# Patient Record
Sex: FEMALE | Race: ASIAN | NOT HISPANIC OR LATINO | URBAN - METROPOLITAN AREA
[De-identification: names, ages, dates, MRNs, and addresses within clinical notes are randomized per-mention and may not be internally consistent; named-entity substitution may affect disease eponyms.]

---

## 2019-03-20 ENCOUNTER — OUTPATIENT (OUTPATIENT)
Dept: EMERGENCY DEPT | Facility: HOSPITAL | Age: 39
LOS: 1 days | Discharge: HOME | End: 2019-03-20
Payer: MEDICAID

## 2019-03-20 VITALS
RESPIRATION RATE: 20 BRPM | OXYGEN SATURATION: 100 % | DIASTOLIC BLOOD PRESSURE: 79 MMHG | HEART RATE: 62 BPM | TEMPERATURE: 98 F | SYSTOLIC BLOOD PRESSURE: 123 MMHG

## 2019-03-20 VITALS
SYSTOLIC BLOOD PRESSURE: 103 MMHG | RESPIRATION RATE: 16 BRPM | DIASTOLIC BLOOD PRESSURE: 65 MMHG | HEART RATE: 72 BPM | OXYGEN SATURATION: 100 %

## 2019-03-20 DIAGNOSIS — O20.0 THREATENED ABORTION: ICD-10-CM

## 2019-03-20 DIAGNOSIS — O03.9 COMPLETE OR UNSPECIFIED SPONTANEOUS ABORTION WITHOUT COMPLICATION: ICD-10-CM

## 2019-03-20 DIAGNOSIS — Z98.890 OTHER SPECIFIED POSTPROCEDURAL STATES: Chronic | ICD-10-CM

## 2019-03-20 DIAGNOSIS — Z98.891 HISTORY OF UTERINE SCAR FROM PREVIOUS SURGERY: Chronic | ICD-10-CM

## 2019-03-20 LAB
APPEARANCE UR: SIGNIFICANT CHANGE UP
BACTERIA # UR AUTO: NEGATIVE — SIGNIFICANT CHANGE UP
BASOPHILS # BLD AUTO: 0.02 K/UL — SIGNIFICANT CHANGE UP (ref 0–0.2)
BASOPHILS NFR BLD AUTO: 0.1 % — SIGNIFICANT CHANGE UP (ref 0–2)
BILIRUB UR-MCNC: NEGATIVE — SIGNIFICANT CHANGE UP
BLD GP AB SCN SERPL QL: NEGATIVE — SIGNIFICANT CHANGE UP
BLOOD UR QL VISUAL: HIGH
COLOR SPEC: YELLOW — SIGNIFICANT CHANGE UP
EOSINOPHIL # BLD AUTO: 0.01 K/UL — SIGNIFICANT CHANGE UP (ref 0–0.5)
EOSINOPHIL NFR BLD AUTO: 0.1 % — SIGNIFICANT CHANGE UP (ref 0–6)
GLUCOSE UR-MCNC: NEGATIVE — SIGNIFICANT CHANGE UP
HCG SERPL-ACNC: 2707 MIU/ML — SIGNIFICANT CHANGE UP
HCT VFR BLD CALC: 38.5 % — SIGNIFICANT CHANGE UP (ref 34.5–45)
HGB BLD-MCNC: 12.8 G/DL — SIGNIFICANT CHANGE UP (ref 11.5–15.5)
HYALINE CASTS # UR AUTO: NEGATIVE — SIGNIFICANT CHANGE UP
IMM GRANULOCYTES NFR BLD AUTO: 0.4 % — SIGNIFICANT CHANGE UP (ref 0–1.5)
KETONES UR-MCNC: NEGATIVE — SIGNIFICANT CHANGE UP
LEUKOCYTE ESTERASE UR-ACNC: SIGNIFICANT CHANGE UP
LYMPHOCYTES # BLD AUTO: 1.18 K/UL — SIGNIFICANT CHANGE UP (ref 1–3.3)
LYMPHOCYTES # BLD AUTO: 8.8 % — LOW (ref 13–44)
MCHC RBC-ENTMCNC: 29.1 PG — SIGNIFICANT CHANGE UP (ref 27–34)
MCHC RBC-ENTMCNC: 33.2 % — SIGNIFICANT CHANGE UP (ref 32–36)
MCV RBC AUTO: 87.5 FL — SIGNIFICANT CHANGE UP (ref 80–100)
MONOCYTES # BLD AUTO: 0.57 K/UL — SIGNIFICANT CHANGE UP (ref 0–0.9)
MONOCYTES NFR BLD AUTO: 4.3 % — SIGNIFICANT CHANGE UP (ref 2–14)
NEUTROPHILS # BLD AUTO: 11.51 K/UL — HIGH (ref 1.8–7.4)
NEUTROPHILS NFR BLD AUTO: 86.3 % — HIGH (ref 43–77)
NITRITE UR-MCNC: NEGATIVE — SIGNIFICANT CHANGE UP
NRBC # FLD: 0 K/UL — LOW (ref 25–125)
PH UR: 6.5 — SIGNIFICANT CHANGE UP (ref 5–8)
PLATELET # BLD AUTO: 233 K/UL — SIGNIFICANT CHANGE UP (ref 150–400)
PMV BLD: 10.3 FL — SIGNIFICANT CHANGE UP (ref 7–13)
PROT UR-MCNC: 30 — SIGNIFICANT CHANGE UP
RBC # BLD: 4.4 M/UL — SIGNIFICANT CHANGE UP (ref 3.8–5.2)
RBC # FLD: 11.6 % — SIGNIFICANT CHANGE UP (ref 10.3–14.5)
RBC CASTS # UR COMP ASSIST: >50 — HIGH (ref 0–?)
RH IG SCN BLD-IMP: POSITIVE — SIGNIFICANT CHANGE UP
SP GR SPEC: 1.01 — SIGNIFICANT CHANGE UP (ref 1–1.04)
SQUAMOUS # UR AUTO: SIGNIFICANT CHANGE UP
UROBILINOGEN FLD QL: NORMAL — SIGNIFICANT CHANGE UP
WBC # BLD: 13.34 K/UL — HIGH (ref 3.8–10.5)
WBC # FLD AUTO: 13.34 K/UL — HIGH (ref 3.8–10.5)
WBC UR QL: HIGH (ref 0–?)

## 2019-03-20 PROCEDURE — 76830 TRANSVAGINAL US NON-OB: CPT | Mod: 26

## 2019-03-20 PROCEDURE — 88305 TISSUE EXAM BY PATHOLOGIST: CPT | Mod: 26

## 2019-03-20 RX ORDER — IBUPROFEN 200 MG
600 TABLET ORAL EVERY 6 HOURS
Qty: 0 | Refills: 0 | Status: DISCONTINUED | OUTPATIENT
Start: 2019-03-20 | End: 2019-03-20

## 2019-03-20 RX ORDER — SODIUM CHLORIDE 9 MG/ML
1000 INJECTION, SOLUTION INTRAVENOUS
Qty: 0 | Refills: 0 | Status: DISCONTINUED | OUTPATIENT
Start: 2019-03-20 | End: 2019-03-20

## 2019-03-20 RX ORDER — ACETAMINOPHEN 500 MG
650 TABLET ORAL ONCE
Qty: 0 | Refills: 0 | Status: COMPLETED | OUTPATIENT
Start: 2019-03-20 | End: 2019-03-20

## 2019-03-20 RX ORDER — ONDANSETRON 8 MG/1
4 TABLET, FILM COATED ORAL EVERY 6 HOURS
Qty: 0 | Refills: 0 | Status: DISCONTINUED | OUTPATIENT
Start: 2019-03-20 | End: 2019-03-20

## 2019-03-20 RX ORDER — ACETAMINOPHEN 500 MG
975 TABLET ORAL EVERY 6 HOURS
Qty: 0 | Refills: 0 | Status: DISCONTINUED | OUTPATIENT
Start: 2019-03-20 | End: 2019-03-20

## 2019-03-20 RX ORDER — IBUPROFEN 200 MG
1 TABLET ORAL
Qty: 0 | Refills: 0 | COMMUNITY
Start: 2019-03-20

## 2019-03-20 RX ORDER — SODIUM CHLORIDE 9 MG/ML
1000 INJECTION INTRAMUSCULAR; INTRAVENOUS; SUBCUTANEOUS ONCE
Qty: 0 | Refills: 0 | Status: COMPLETED | OUTPATIENT
Start: 2019-03-20 | End: 2019-03-20

## 2019-03-20 RX ORDER — ACETAMINOPHEN 500 MG
3 TABLET ORAL
Qty: 0 | Refills: 0 | COMMUNITY
Start: 2019-03-20

## 2019-03-20 RX ORDER — ONDANSETRON 8 MG/1
4 TABLET, FILM COATED ORAL ONCE
Qty: 0 | Refills: 0 | Status: COMPLETED | OUTPATIENT
Start: 2019-03-20 | End: 2019-03-20

## 2019-03-20 RX ADMIN — ONDANSETRON 4 MILLIGRAM(S): 8 TABLET, FILM COATED ORAL at 04:55

## 2019-03-20 RX ADMIN — Medication 650 MILLIGRAM(S): at 08:38

## 2019-03-20 RX ADMIN — Medication 650 MILLIGRAM(S): at 04:55

## 2019-03-20 RX ADMIN — SODIUM CHLORIDE 1000 MILLILITER(S): 9 INJECTION INTRAMUSCULAR; INTRAVENOUS; SUBCUTANEOUS at 04:55

## 2019-03-20 RX ADMIN — SODIUM CHLORIDE 125 MILLILITER(S): 9 INJECTION, SOLUTION INTRAVENOUS at 11:53

## 2019-03-20 NOTE — ED PROVIDER NOTE - PROGRESS NOTE DETAILS
Bernardo: pt miscarrying, does not feel she can pass pregnancy at home, requesting d&c, OB consulted and will come see her [place]: LAbs grossly wnl, Rh+, UA pending, hcg downtrending. US w/ IUP no FHR. Clinically pt is miscarrying. Symptomatically she is improving, abd soft ntnd. PT very adamant about getting D and C. Will consult OB.

## 2019-03-20 NOTE — H&P ADULT - NSHPLABSRESULTS_GEN_ALL_CORE
LABS:                        12.8   13.34 )-----------( 233      ( 20 Mar 2019 04:50 )             38.5         136  |  101  |  9   ----------------------------<  135<H>  4.0   |  24  |  0.65    Ca    9.4      20 Mar 2019 04:50    TPro  7.7  /  Alb  4.4  /  TBili  0.3  /  DBili  x   /  AST  14  /  ALT  16  /  AlkPhos  60        Urinalysis Basic - ( 20 Mar 2019 05:48 )    Color: YELLOW / Appearance: Lt TURBID / S.012 / pH: 6.5  Gluc: NEGATIVE / Ketone: NEGATIVE  / Bili: NEGATIVE / Urobili: NORMAL   Blood: LARGE / Protein: 30 / Nitrite: NEGATIVE   Leuk Esterase: SMALL / RBC: >50 / WBC 6-10   Sq Epi: FEW / Non Sq Epi: x / Bacteria: NEGATIVE        Blood Type: B Positive      RADIOLOGY & ADDITIONAL STUDIES:  TVUS (3/20): Irregular gestational sac low in the endometrial canal and no fetal cardiac activity. Findings are concerning for failed first trimester pregnancy, however, cannot be confirmed on the basis of the crown-rump length size. Continual follow-up serial beta hCGs is recommended with repeat ultrasound as clinically warranted.

## 2019-03-20 NOTE — H&P ADULT - ATTENDING COMMENTS
Pt seen and examined by me.  Inevitable  desires definitive surgical management.  FOr DVC.  R/A/B of procedure d/w pt including but not limited to infection, bleeding and injury to uterus.   All questions and concerns were addressed

## 2019-03-20 NOTE — H&P ADULT - NSHPPHYSICALEXAM_GEN_ALL_CORE
Vital Signs Last 24 Hrs  T(C): 36.1 (20 Mar 2019 05:08), Max: 36.4 (20 Mar 2019 03:35)  T(F): 97 (20 Mar 2019 05:08), Max: 97.5 (20 Mar 2019 03:35)  HR: 71 (20 Mar 2019 05:08) (62 - 71)  BP: 102/57 (20 Mar 2019 05:08) (102/57 - 123/79)  BP(mean): --  RR: 18 (20 Mar 2019 05:08) (18 - 20)  SpO2: 100% (20 Mar 2019 05:08) (100% - 100%)    PHYSICAL EXAM:      Constitutional: alert and oriented x 3    Respiratory: clear    Cardiovascular: regular rate and rhythm    Gastrointestinal: soft, non tender, + bowel sounds. No hepatosplenomegaly, no palpable masses    SSE: os open with small quarter-sized clot removed with ring forceps, no active bleeding, no products currently at the os  SVE: cervix dilated to 1.5cm, no uterine or adnexal tenderness in b/l LE    Extremities: no swelling or tenderness in b/l LE

## 2019-03-20 NOTE — BRIEF OPERATIVE NOTE - NSICDXBRIEFPROCEDURE_GEN_ALL_CORE_FT
PROCEDURES:  Dilation and evacuation of uterus using suction curettage 20-Mar-2019 13:33:46  Axel Haynes

## 2019-03-20 NOTE — ED ADULT NURSE NOTE - CHIEF COMPLAINT QUOTE
pt reports being 6-8 weeks pregnant with confirmed miscarriage from [facility]. pt states "I left [facility] because they couldn't do the  surgery, only with a pill." endorses bleeding x 6 days and pain x 1 day. pt very uncomfrotable in triage

## 2019-03-20 NOTE — H&P ADULT - HISTORY OF PRESENT ILLNESS
38y  LMP  at 8.6 weeks GA who presents from [facility] with cramping and vaginal bleeding. She notes that she had cramping for the last two days and has had spotting for the last week. Yesterday, the cramping became more intense and her bleeding increased. She notes that it is heavier than a period and she is passing clots. She first went to [facility], and she notes she was in significant pain. They told her should could get cytotec or expectantly manage, so she come to the [facility] ED in hopes of getting a D&C. She was given PO tylenol in the ED for pain, and she is currently comfortable with minimal bleeding. She denies fevers, chills, nausea, vomiting, chest pain, shortness of breath, constipation, diarrhea, and dysuria.    OB/GYN HISTORY:   & C/S, D&C x1 eTOP, denies fibriods, ovarian cysts, STDs and abnormal paps  Name of GYN Physician: GYN at [facility]

## 2019-03-20 NOTE — H&P ADULT - PROBLEM SELECTOR PLAN 1
-pt without active bleeding  -VSS  -cervix dilated to 1.5cm  -discussed options of expectant management, medical management and surgical management  -pt electing surgical management  -NPO  -LR@125  -monitor bleeding  -monitor VS  -add on for dilation and curettage    Pt discussed with Dr. Jennifer Haynes MD PGY2

## 2019-03-20 NOTE — ED ADULT NURSE NOTE - NSIMPLEMENTINTERV_GEN_ALL_ED
Implemented All Universal Safety Interventions:  [place] to call system. Call bell, personal items and telephone within reach. Instruct patient to call for assistance. Room bathroom lighting operational. Non-slip footwear when patient is off stretcher. Physically safe environment: no spills, clutter or unnecessary equipment. Stretcher in lowest position, wheels locked, appropriate side rails in place.

## 2019-03-20 NOTE — ED PROVIDER NOTE - OBJECTIVE STATEMENT
38F  (twins) LMP 1/17 p/w vaginal bleeding. Pt w/ 6d of vaginal bleeding, ~1 pad per hour. Also 1d of intermittent b/l lower abd cramping. +Nausea, no vomit. Denies lightheaded, SOB/CP, diarrhea, urinary complaints, black/bloody stool, bleeding from elsewhere. Not on AC. Had not had any OB f/u yet. Went o [facility] yesterday - results w/ pt. CBC, cmp, coags, vbg comprehensive all grossly wnl. hcg 4593. TVUS report showing 6w3d IUP w/ no detectable HR. Per pt, they offered her pill which she did not want. Pt left from there and came straight to [facility] because she wants surgery to have fetus removed.  States she was given morphine and no tylenol.

## 2019-03-20 NOTE — ASU DISCHARGE PLAN (ADULT/PEDIATRIC) - NURSING INSTRUCTIONS
You were given Motrin  in the operating room, so you may not take any Motrin product until __7pm _________

## 2019-03-20 NOTE — ASU DISCHARGE PLAN (ADULT/PEDIATRIC) - CALL YOUR DOCTOR IF YOU HAVE ANY OF THE FOLLOWING:
Pain not relieved by Medications/Fever greater than (need to indicate Fahrenheit or Celsius)/Wound/Surgical Site with redness, or foul smelling discharge or pus/Numbness, tingling, color or temperature change to extremity/Swelling that gets worse/Inability to tolerate liquids or foods/Increased irritability or sluggishness/Bleeding that does not stop/Nausea and vomiting that does not stop/Unable to urinate/Excessive diarrhea

## 2019-03-20 NOTE — ED ADULT NURSE NOTE - OBJECTIVE STATEMENT
Pt A/Ox3 states [facility] and was told she is having a miscarriage. Pt states she is 6-8 week pregnant and  noted bleeding for the last 6 days. Pt states she is feeling nauseous +abdominal pain. Pt appears uncomfortable in stretcher. Breathing well and non labored, skin warm and dry. PIV inserted with aseptic technique, blood drawn, labeled at bedside with 2 pt identifiers and sent to lab. Pt and family aware of POC, NAD. Will continue to monitor. Pt medicated per MD order.

## 2019-03-20 NOTE — ED ADULT TRIAGE NOTE - CHIEF COMPLAINT QUOTE
pt reports being 6-8 weeks pregnant with confirmed miscarriage from [facility]. pt states "I left [facility] because they couldn't do the  surgery, only with a pill." endorses bleeding x 6 days and pain x 1 day. pt very uncomfrotable in triage

## 2019-03-20 NOTE — ED PROVIDER NOTE - ATTENDING CONTRIBUTION TO CARE
38F  (twins) LMP 1/17 p/w vaginal bleeding. Pt w/ 6d of vaginal bleeding, ~1 pad per hour. Also 1d of intermittent b/l lower abd cramping. +Nausea, no vomit. Had not had any OB f/u yet. Went o [facility] yesterday - results w/ pt. CBC, cmp, coags, vbg comprehensive all grossly wnl. hcg 4593. TVUS report shoing 6w3d IUP w/ no detectable HR. Per pt, they offered her pill which she did not want. Pt left from there and came straight to [facility] because she wants surgery to have fetus removed. Vitals wnl, exam as above. No t and S in results  ddx: Threatened ab. Fetal demise vs. early pregnancy. Clinically very low suspicion for infection to warrant immediate ob eval/I and d.   CBC, cmp, T and S. UA, hcg, repeat TVUS, symptom control, reassess.

## 2019-03-20 NOTE — ASU DISCHARGE PLAN (ADULT/PEDIATRIC) - CARE PROVIDER_API CALL
FABY BELLO,   [address]  [address] [place] [address]  Phone: [phone]  Fax: (   )    -  Follow Up Time:

## 2019-03-22 LAB — SURGICAL PATHOLOGY STUDY: SIGNIFICANT CHANGE UP

## 2025-05-01 NOTE — ED PROVIDER NOTE - CAS EDP CONSULT REGARDING 1
Airway    Performed by: Tiki Barrientos MD  Authorized by: Tiki Barrientos consult